# Patient Record
Sex: FEMALE | Race: WHITE | Employment: FULL TIME | URBAN - METROPOLITAN AREA
[De-identification: names, ages, dates, MRNs, and addresses within clinical notes are randomized per-mention and may not be internally consistent; named-entity substitution may affect disease eponyms.]

---

## 2017-12-11 ENCOUNTER — HOSPITAL ENCOUNTER (OUTPATIENT)
Dept: LAB | Age: 42
Discharge: HOME | End: 2017-12-11
Payer: COMMERCIAL

## 2017-12-11 ENCOUNTER — OFFICE VISIT (OUTPATIENT)
Dept: HEMATOLOGY | Age: 42
End: 2017-12-11

## 2017-12-11 VITALS
HEART RATE: 100 BPM | HEIGHT: 62 IN | TEMPERATURE: 98.7 F | RESPIRATION RATE: 16 BRPM | SYSTOLIC BLOOD PRESSURE: 127 MMHG | OXYGEN SATURATION: 100 % | BODY MASS INDEX: 28.36 KG/M2 | WEIGHT: 154.13 LBS | DIASTOLIC BLOOD PRESSURE: 78 MMHG

## 2017-12-11 DIAGNOSIS — K76.0 FATTY LIVER: Primary | ICD-10-CM

## 2017-12-11 DIAGNOSIS — K76.0 FATTY LIVER: ICD-10-CM

## 2017-12-11 PROBLEM — I10 HYPERTENSION: Status: ACTIVE | Noted: 2017-12-11

## 2017-12-11 LAB
ALBUMIN SERPL-MCNC: 3.8 G/DL (ref 3.4–5)
ALBUMIN/GLOB SERPL: 1.2 {RATIO} (ref 0.8–1.7)
ALP SERPL-CCNC: 47 U/L (ref 45–117)
ALT SERPL-CCNC: 24 U/L (ref 13–56)
ANION GAP SERPL CALC-SCNC: 11 MMOL/L (ref 3–18)
AST SERPL-CCNC: 12 U/L (ref 15–37)
BASOPHILS # BLD: 0 K/UL (ref 0–0.06)
BASOPHILS NFR BLD: 0 % (ref 0–2)
BILIRUB DIRECT SERPL-MCNC: 0.1 MG/DL (ref 0–0.2)
BILIRUB SERPL-MCNC: 0.4 MG/DL (ref 0.2–1)
BUN SERPL-MCNC: 11 MG/DL (ref 7–18)
BUN/CREAT SERPL: 14 (ref 12–20)
CALCIUM SERPL-MCNC: 9.5 MG/DL (ref 8.5–10.1)
CHLORIDE SERPL-SCNC: 105 MMOL/L (ref 100–108)
CHOLEST SERPL-MCNC: 147 MG/DL
CO2 SERPL-SCNC: 26 MMOL/L (ref 21–32)
CREAT SERPL-MCNC: 0.8 MG/DL (ref 0.6–1.3)
DIFFERENTIAL METHOD BLD: NORMAL
EOSINOPHIL # BLD: 0.2 K/UL (ref 0–0.4)
EOSINOPHIL NFR BLD: 3 % (ref 0–5)
ERYTHROCYTE [DISTWIDTH] IN BLOOD BY AUTOMATED COUNT: 12.1 % (ref 11.6–14.5)
EST. AVERAGE GLUCOSE BLD GHB EST-MCNC: 108 MG/DL
GLOBULIN SER CALC-MCNC: 3.2 G/DL (ref 2–4)
GLUCOSE SERPL-MCNC: 92 MG/DL (ref 74–99)
HBA1C MFR BLD: 5.4 % (ref 4.5–5.6)
HCT VFR BLD AUTO: 44.5 % (ref 35–45)
HDLC SERPL-MCNC: 46 MG/DL (ref 40–60)
HDLC SERPL: 3.2 {RATIO} (ref 0–5)
HGB BLD-MCNC: 15.2 G/DL (ref 12–16)
INR PPP: 1 (ref 0.8–1.2)
LDLC SERPL CALC-MCNC: 72.8 MG/DL (ref 0–100)
LIPID PROFILE,FLP: NORMAL
LYMPHOCYTES # BLD: 2.6 K/UL (ref 0.9–3.6)
LYMPHOCYTES NFR BLD: 33 % (ref 21–52)
MCH RBC QN AUTO: 31.8 PG (ref 24–34)
MCHC RBC AUTO-ENTMCNC: 34.2 G/DL (ref 31–37)
MCV RBC AUTO: 93.1 FL (ref 74–97)
MONOCYTES # BLD: 0.4 K/UL (ref 0.05–1.2)
MONOCYTES NFR BLD: 5 % (ref 3–10)
NEUTS SEG # BLD: 4.8 K/UL (ref 1.8–8)
NEUTS SEG NFR BLD: 59 % (ref 40–73)
PLATELET # BLD AUTO: 297 K/UL (ref 135–420)
PMV BLD AUTO: 10.2 FL (ref 9.2–11.8)
POTASSIUM SERPL-SCNC: 3.8 MMOL/L (ref 3.5–5.5)
PROT SERPL-MCNC: 7 G/DL (ref 6.4–8.2)
PROTHROMBIN TIME: 12.8 SEC (ref 11.5–15.2)
RBC # BLD AUTO: 4.78 M/UL (ref 4.2–5.3)
SODIUM SERPL-SCNC: 142 MMOL/L (ref 136–145)
TRIGL SERPL-MCNC: 141 MG/DL (ref ?–150)
VLDLC SERPL CALC-MCNC: 28.2 MG/DL
WBC # BLD AUTO: 8 K/UL (ref 4.6–13.2)

## 2017-12-11 PROCEDURE — 86706 HEP B SURFACE ANTIBODY: CPT | Performed by: INTERNAL MEDICINE

## 2017-12-11 PROCEDURE — 83540 ASSAY OF IRON: CPT | Performed by: INTERNAL MEDICINE

## 2017-12-11 PROCEDURE — 80076 HEPATIC FUNCTION PANEL: CPT | Performed by: INTERNAL MEDICINE

## 2017-12-11 PROCEDURE — 85610 PROTHROMBIN TIME: CPT | Performed by: INTERNAL MEDICINE

## 2017-12-11 PROCEDURE — 82103 ALPHA-1-ANTITRYPSIN TOTAL: CPT | Performed by: INTERNAL MEDICINE

## 2017-12-11 PROCEDURE — 86803 HEPATITIS C AB TEST: CPT | Performed by: INTERNAL MEDICINE

## 2017-12-11 PROCEDURE — 83516 IMMUNOASSAY NONANTIBODY: CPT | Performed by: INTERNAL MEDICINE

## 2017-12-11 PROCEDURE — 82390 ASSAY OF CERULOPLASMIN: CPT | Performed by: INTERNAL MEDICINE

## 2017-12-11 PROCEDURE — 87340 HEPATITIS B SURFACE AG IA: CPT | Performed by: INTERNAL MEDICINE

## 2017-12-11 PROCEDURE — 36415 COLL VENOUS BLD VENIPUNCTURE: CPT | Performed by: INTERNAL MEDICINE

## 2017-12-11 PROCEDURE — 80048 BASIC METABOLIC PNL TOTAL CA: CPT | Performed by: INTERNAL MEDICINE

## 2017-12-11 PROCEDURE — 85025 COMPLETE CBC W/AUTO DIFF WBC: CPT | Performed by: INTERNAL MEDICINE

## 2017-12-11 PROCEDURE — 86038 ANTINUCLEAR ANTIBODIES: CPT | Performed by: INTERNAL MEDICINE

## 2017-12-11 PROCEDURE — 83036 HEMOGLOBIN GLYCOSYLATED A1C: CPT | Performed by: INTERNAL MEDICINE

## 2017-12-11 PROCEDURE — 86704 HEP B CORE ANTIBODY TOTAL: CPT | Performed by: INTERNAL MEDICINE

## 2017-12-11 PROCEDURE — 86708 HEPATITIS A ANTIBODY: CPT | Performed by: INTERNAL MEDICINE

## 2017-12-11 PROCEDURE — 80061 LIPID PANEL: CPT | Performed by: INTERNAL MEDICINE

## 2017-12-11 PROCEDURE — 82728 ASSAY OF FERRITIN: CPT | Performed by: INTERNAL MEDICINE

## 2017-12-11 RX ORDER — NALTREXONE HYDROCHLORIDE AND BUPROPION HYDROCHLORIDE 8; 90 MG/1; MG/1
2 TABLET, EXTENDED RELEASE ORAL 2 TIMES DAILY
Refills: 0 | COMMUNITY
Start: 2017-12-01 | End: 2021-08-18

## 2017-12-11 RX ORDER — AMLODIPINE BESYLATE 2.5 MG/1
1 TABLET ORAL DAILY
Refills: 0 | COMMUNITY
Start: 2017-11-20

## 2017-12-11 NOTE — PROGRESS NOTES
354 Leny Koehler MD, [facility], [place], [place]       YURY Guevara PA-C Jud Alstrom, [facility]   Domenica Age, NP    Adiel Anand NP        at [facility]     [address], [address] [address]  [address].     [phone]     FAX: [phone]    at [facility], [address] [place] [address], [address]     [phone]     FAX: [phone]       Patient Care Team:  Mt Godoy DO as PCP - General (Family Practice)      Problem List  Date Reviewed: 12/11/2017          Codes Class Noted    Hypertension ICD-10-CM: I10  ICD-9-CM: 401.9  12/11/2017        Fatty liver ICD-10-CM: K76.0  ICD-9-CM: 571.8  12/11/2017                The physicians listed above have asked me to see Marilyn Faust in consultation regarding suspected fatty liver disease and its management. No medical records were available for review when the patient was here for the appointment. The patient is a 36 y.o.  female who is suspected to have fatty liver disease based upon ultrasound. Serologic evaluation was either not performed or available to me. Ultrasound of the liver was performed in 8/2017. The results of the imaging are not available at this time. The patient believes this suggested fatty liver disease. An assessment of liver fibrosis with biopsy or elastography has not been performed. The most recent laboratory studies are not avialable. The patient has lost a total of 8 pounds in the past 4 months since undergoing weight reduction surgery. The patient has no symptoms which could be attributed to the liver disorder. The patient completes all daily activities without any functional limitations.       The patient has not experienced fatigue, pain in the right side over the liver,       ALLERGIES  Allergies   Allergen Reactions    Cephalexin Rash    Cortisporin [Neomycin-Polymyxin-Hc] Rash       MEDICATIONS  Current Outpatient Prescriptions   Medication Sig    amLODIPine (NORVASC) 2.5 mg tablet Take 1 Tab by mouth daily.  CONTRAVE 8-90 mg TbER ER tablet Take 2 Tabs by mouth two (2) times a day.  hydrochlorothiazide (HYDRODIURIL) 25 mg tablet Take 25 mg by mouth daily.  omeprazole (PRILOSEC) 40 mg capsule Take 40 mg by mouth daily.  levonorgestrel-ethinyl estradiol (SEASONALE CONTRACEPTIVE) 0.15-30 mg-mcg per tablet Take  by mouth.  doxycycline (MONODOX) 100 mg capsule Take 100 mg by mouth two (2) times a day. No current facility-administered medications for this visit. SYSTEM REVIEW NOT RELATED TO LIVER DISEASE OR REVIEWED ABOVE:  Constitution systems: Negative for fever, chills, weight gain, weight loss. Eyes: Negative for visual changes. ENT: Negative for sore throat, painful swallowing. Respiratory: Negative for cough, hemoptysis, SOB. Cardiology: Negative for chest pain, palpitations. GI:  Negative for constipation or diarrhea. : Negative for urinary frequency, dysuria, hematuria, nocturia. Skin: Negative for rash. Hematology: Negative for easy bruising, blood clots. Musculo-skelatal: Negative for back pain, muscle pain, weakness. Neurologic: Negative for headaches, dizziness, vertigo, memory problems not related to HE. Psychology: Negative for anxiety, depression. FAMILY HISTORY:  The father has the following chronic diseases: Osler-Weber-Rendu, thrombosis. The mother has the following chronic diseases: DM, HTN. There is no family history of liver disease. SOCIAL HISTORY:  The patient is . The patient has 2 children,   The patient has never used tobacco products. The patient has never consumed significant amounts of alcohol. The patient currently works full time probabation and .         PHYSICAL EXAMINATION:  Visit Vitals    /78    Pulse 100    Temp 98.7 °F (37.1 °C) (Tympanic)    Resp 16    Ht 5' 2\" (1.575 m)    Wt 154 lb 2 oz (69.9 kg)    SpO2 100%    BMI 28.19 kg/m2     General: No acute distress. Eyes: Sclera anicteric. ENT: No oral lesions. Thyroid normal.  Nodes: No adenopathy. Skin: No spider angiomata. No jaundice. No palmar erythema. Respiratory: Lungs clear to auscultation. Cardiovascular: Regular heart rate. No murmurs. No JVD. Abdomen: Soft non-tender. Liver size normal to percussion/palpation. Spleen not palpable. No obvious ascites. Extremities: No edema. No muscle wasting. No gross arthritic changes. Neurologic: Alert and oriented. Cranial nerves grossly intact. No asterixis. LABORATORY STUDIES:  [facility] [place] of [address] Units 12/11/2017   WBC 4.6 - 13.2 K/uL 8.0   ANC 1.8 - 8.0 K/UL 4.8   HGB 12.0 - 16.0 g/dL 15.2    - 420 K/uL 297   INR 0.8 - 1.2   1.0   AST 15 - 37 U/L 12 (L)   ALT 13 - 56 U/L 24   Alk Phos 45 - 117 U/L 47   Bili, Total 0.2 - 1.0 MG/DL 0.4   Bili, Direct 0.0 - 0.2 MG/DL 0.1   Albumin 3.4 - 5.0 g/dL 3.8   BUN 7.0 - 18 MG/DL 11   Creat 0.6 - 1.3 MG/DL 0.80   Na 136 - 145 mmol/L 142   K 3.5 - 5.5 mmol/L 3.8   Cl 100 - 108 mmol/L 105   CO2 21 - 32 mmol/L 26   Glucose 74 - 99 mg/dL 92     SEROLOGIES:  Serologies Latest Ref Rng & Units 12/11/2017   Hep A Ab, Total NEGATIVE   NEGATIVE   Hep B Core Ab, Total NEGATIVE   NEGATIVE   Hep C Ab 0.0 - 0.9 s/co ratio <0.1   Ferritin 8 - 388 NG/   Iron % Saturation % 60   Ceruloplasmin 19.0 - 39.0 mg/dL 40.2 (H)   Alpha-1 antitrypsin level 90 - 200 mg/dL 151     LIVER HISTOLOGY:  Not available or performed    ENDOSCOPIC PROCEDURES:  Not available or performed    RADIOLOGY:  Not available or performed    OTHER TESTING:  Not available or performed    ASSESSMENT AND PLAN:  Suspected fatty liver disease of unclear histologic severity.       Liver transaminases are normal.  Alkaline phosphate is normal.  Liver function is normal.  The platelet count is normal.      Will perform laboratory testing to monitor liver function and degree of liver injury. This included BMP, hepatic panel, CBC with platelet count, INR. Will perform serologic and virologic studies to assess for other causes of chronic liver disease. Suspect the patient has fatty liver based upon ultrasound, and serology that is negative for all other causes of chronic liver disease. Only a liver biopsy can confirm is the patient does have fatty liver and differentiate benign steatosis from LANCE. The treatment is the same; weight reduction. If the liver biopsy demonstrates LANCE the patient would be eligible for enrollment into clinical trials for treatment of LANCE. The need to perform liver biopsy to assess disease severity was discussed with the patient. The risks of performing the liver biopsy including pain, puncture of the lung, gallbladder, intestine or kidney and bleeding were discussed. Will defer liver biopsy for now. The need to assess liver fibrosis was discussed. Sheer wave elastography can assess liver fibrosis and determine if a patient has advanced fibrosis or cirrhosis without the need for liver biopsy. Sheer wave elastography is now available at [facility]. This will be scheduled. If elastrography suggests advanced fibrosis then a liver biopsy should be considered. The patient was counseled regarding diet and exercise to achieve weight loss. The best diet for patients with fatty liver is one very low in carbohydrates and enriched with protein such as an Atkins program.      The patient was directed to continue all current medications at the current dosages. There are no contraindications for the patient to take any medications that are necessary for treatment of other medical issues including medications for diabetes mellitus and hypercholesterolemia.     The patient was counseled regarding alcohol consumption and that this could contribute to fatty liver disease. Vaccination for viral hepatitis A and B is recommended since the patient has no serologic evidence of previous exposure or vaccination with immunity. All of the above issues were discussed with the patient. All questions were answered. The patient expressed a clear understanding of the above. [address] [place] [address] in 4 weeks to review all data and determine the treatment plan.     Dimitry Roca MD  [facility] [place] of [address] [place] [address], [address] [place] [address], [address] [place] [address] Street [address]  [phone]

## 2017-12-11 NOTE — MR AVS SNAPSHOT
Visit Information Date & Time Provider Department Dept. Phone Encounter #  
 12/11/2017 11:30 AM MD Sierra Troncoso [address] of  [address] [id number] Follow-up Instructions Return in about 4 weeks (around 1/8/2018) for MLS on :35. Upcoming Health Maintenance Date Due DTaP/Tdap/Td series (1 - Tdap) 3/1/1995 PAP AKA CERVICAL CYTOLOGY 3/1/1995 Influenza Age 5 to Adult 8/1/2017 Allergies as of 12/11/2017  Review Complete On: 12/11/2017 By: Tena Pritchard Severity Noted Reaction Type Reactions Cephalexin  05/05/2015    Rash Cortisporin [Neomycin-polymyxin-hc]  05/05/2015    Rash Current Immunizations  Never Reviewed No immunizations on file. Not reviewed this visit You Were Diagnosed With   
  
 Codes Comments Fatty liver    -  Primary ICD-10-CM: K76.0 ICD-9-CM: 571.8 Vitals BP Pulse Temp Resp Height(growth percentile) Weight(growth percentile) 127/78 100 98.7 °F (37.1 °C) (Tympanic) 16 5' 2\" (1.575 m) 154 lb 2 oz (69.9 kg) SpO2 BMI OB Status Smoking Status 100% 28.19 kg/m2 Having regular periods Never Smoker BMI and BSA Data Body Mass Index Body Surface Area  
 28.19 kg/m 2 1.75 m 2 Your Updated Medication List  
  
   
This list is accurate as of: 12/11/17  1:06 PM.  Always use your most recent med list. amLODIPine 2.5 mg tablet Commonly known as:  Wandra Vickie Take 1 Tab by mouth daily. CONTRAVE 8-90 mg Tber ER tablet Generic drug:  naltrexone-buPROPion Take 2 Tabs by mouth two (2) times a day. doxycycline 100 mg capsule Commonly known as:  Daved Pollen Take 100 mg by mouth two (2) times a day. hydroCHLOROthiazide 25 mg tablet Commonly known as:  HYDRODIURIL Take 25 mg by mouth daily. PriLOSEC 40 mg capsule Generic drug:  omeprazole Take 40 mg by mouth daily. SEASONALE CONTRACEPTIVE 0.15 mg-30 mcg 3mpk Generic drug:  levonorgestrel-ethinyl estradiol Take  by mouth. Follow-up Instructions Return in about 4 weeks (around 1/8/2018) for MLS on :35. To-Do List   
 12/11/2017 Lab:  ACTIN (SMOOTH MUSCLE) ANTIBODY   
  
 12/11/2017 Lab:  ALPHA-1-ANTITRYPSIN, TOTAL   
  
 12/11/2017 Lab:  ANTINUCLEAR ANTIBODIES, IFA   
  
 12/11/2017 Lab:  CBC WITH AUTOMATED DIFF   
  
 12/11/2017 Lab:  CERULOPLASMIN   
  
 12/11/2017 Lab:  FERRITIN   
  
 12/11/2017 Lab:  HCV AB W/REFLEX VERIFICATION   
  
 12/11/2017 Lab:  HEMOGLOBIN A1C WITH EAG   
  
 12/11/2017 Lab:  HEP A AB, TOTAL   
  
 12/11/2017 Lab:  HEP B SURFACE AB   
  
 12/11/2017 Lab:  HEP B SURFACE AG   
  
 12/11/2017 Lab:  HEPATIC FUNCTION PANEL   
  
 12/11/2017 Lab:  HEPATITIS B CORE AB, TOTAL   
  
 12/11/2017 Lab:  IRON PROFILE   
  
 12/11/2017 Lab:  LIPID PANEL   
  
 12/11/2017 Lab:  METABOLIC PANEL, BASIC   
  
 12/11/2017 Lab:  PROTHROMBIN TIME + INR   
  
 12/11/2017 Imaging:  US ABD LTD W ELASTOGRAPHY Introducing [facility] & HE[facility]! Pierce Ibanez introduces Glow patient portal. Now you can access parts of your medical record, email your doctor's office, and request medication refills online. 1. In your internet browser, go to [email or web address] Dr[employer] [email or web address] 2. Click on the First Time User? Click Here link in the Sign In box. You will see the New Member Sign Up page. 3. Enter your Glow Access Code exactly as it appears below. You will not need to use this code after youve completed the sign-up process. If you do not sign up before the expiration date, you must request a new code. · Glow Access Code: [email or web address] Expires: 3/11/2018  1:06 PM 
 
4. Enter the last four digits of your Social Security Number (xxxx) and Date of Birth (mm/dd/yyyy) as indicated and click Submit.  You will be taken to the next sign-up page. 5. Create a Ad Knights ID. This will be your Ad Knights login ID and cannot be changed, so think of one that is secure and easy to remember. 6. Create a Ad Knights password. You can change your password at any time. 7. Enter your Password Reset Question and Answer. This can be used at a later time if you forget your password. 8. Enter your e-mail address. You will receive e-mail notification when new information is available in [address] 9. Click Sign Up. You can now view and download portions of your medical record. 10. Click the Download Summary menu link to download a portable copy of your medical information. If you have questions, please visit the Frequently Asked Questions section of the Ad Knights website. Remember, Ad Knights is NOT to be used for urgent needs. For medical emergencies, dial 911. Now available from your iPhone and Android! Please provide this summary of care documentation to your next provider. Your primary care clinician is listed as Elsi Yu. If you have any questions after today's visit, please call [phone].

## 2017-12-12 LAB
A1AT SERPL-MCNC: 151 MG/DL (ref 90–200)
CERULOPLASMIN SERPL-MCNC: 40.2 MG/DL (ref 19–39)
COMMENT, 144067: NORMAL
FERRITIN SERPL-MCNC: 210 NG/ML (ref 8–388)
HAV AB SER QL IA: NEGATIVE
HBV CORE AB SERPL QL IA: NEGATIVE
HCV AB S/CO SERPL IA: <0.1 S/CO RATIO (ref 0–0.9)
IRON SATN MFR SERPL: 60 %
IRON SERPL-MCNC: 213 UG/DL (ref 50–175)
TIBC SERPL-MCNC: 356 UG/DL (ref 250–450)

## 2017-12-13 LAB
ACTIN IGG SERPL-ACNC: 8 UNITS (ref 0–19)
ANA TITR SER IF: NEGATIVE {TITER}
HBV SURFACE AB SER QL IA: POSITIVE
HBV SURFACE AB SERPL IA-ACNC: 230.4 MIU/ML
HBV SURFACE AG SER QL: <0.1 INDEX
HBV SURFACE AG SER QL: NEGATIVE
HEP BS AB COMMENT,HBSAC: NORMAL

## 2017-12-27 ENCOUNTER — HOSPITAL ENCOUNTER (OUTPATIENT)
Dept: ULTRASOUND IMAGING | Age: 42
Discharge: HOME | End: 2017-12-27
Attending: INTERNAL MEDICINE
Payer: COMMERCIAL

## 2017-12-27 DIAGNOSIS — K76.0 FATTY LIVER: ICD-10-CM

## 2017-12-27 PROCEDURE — 0346T US ABD LTD W ELASTOGRAPHY: CPT

## 2018-02-26 ENCOUNTER — OFFICE VISIT (OUTPATIENT)
Dept: HEMATOLOGY | Age: 43
End: 2018-02-26

## 2018-02-26 VITALS
HEIGHT: 62 IN | DIASTOLIC BLOOD PRESSURE: 84 MMHG | HEART RATE: 104 BPM | SYSTOLIC BLOOD PRESSURE: 122 MMHG | OXYGEN SATURATION: 99 % | TEMPERATURE: 98.7 F | WEIGHT: 153 LBS | RESPIRATION RATE: 16 BRPM | BODY MASS INDEX: 28.16 KG/M2

## 2018-02-26 DIAGNOSIS — K76.0 FATTY LIVER: Primary | ICD-10-CM

## 2018-02-26 NOTE — MR AVS SNAPSHOT
[address] [address] 
[phone] Patient: Jarred Moura MRN: [id number] TOA:[id number] Visit Information Date & Time Provider Department Dept. Phone Encounter #  
 2/26/2018  1:45 PM Papito Dexter MD [address] [place] [address] [phone] Follow-up Instructions Return in about 6 months (around 8/26/2018) for [place]  or Tivis Son. Upcoming Health Maintenance Date Due DTaP/Tdap/Td series (1 - Tdap) 3/1/1995 PAP AKA CERVICAL CYTOLOGY 3/1/1995 Influenza Age 5 to Adult 8/1/2017 Allergies as of 2/26/2018  Review Complete On: 2/26/2018 By: Ileana Nelson Severity Noted Reaction Type Reactions Cephalexin  05/05/2015    Rash Cortisporin [Neomycin-polymyxin-hc]  05/05/2015    Rash Current Immunizations  Never Reviewed No immunizations on file. Not reviewed this visit Vitals BP Pulse Temp Resp Height(growth percentile) Weight(growth percentile) 122/84 (BP 1 Location: Right arm, BP Patient Position: Sitting) (!) 104 98.7 °F (37.1 °C) (Tympanic) 16 5' 2\" (1.575 m) 153 lb (69.4 kg) LMP SpO2 BMI OB Status Smoking Status 01/26/2018 99% 27.98 kg/m2 Having regular periods Never Smoker Vitals History BMI and BSA Data Body Mass Index Body Surface Area  
 27.98 kg/m 2 1.74 m 2 Your Updated Medication List  
  
   
This list is accurate as of 2/26/18  2:45 PM.  Always use your most recent med list. amLODIPine 2.5 mg tablet Commonly known as:  Tamika Link Take 1 Tab by mouth daily. CONTRAVE 8-90 mg Tber ER tablet Generic drug:  naltrexone-buPROPion Take 2 Tabs by mouth two (2) times a day. doxycycline 100 mg capsule Commonly known as:  Cara Jumper Take 100 mg by mouth two (2) times a day. hydroCHLOROthiazide 25 mg tablet Commonly known as:  HYDRODIURIL Take 25 mg by mouth daily. PriLOSEC 40 mg capsule Generic drug:  omeprazole Take 40 mg by mouth daily. SEASONALE CONTRACEPTIVE 0.15 mg-30 mcg 3mpk Generic drug:  levonorgestrel-ethinyl estradiol Take  by mouth. Follow-up Instructions Return in about 6 months (around 8/26/2018) for Rebecca Vasquez or [place]. Introducing [facility]! Dear Yomi Palomino: Thank you for requesting a Nearbox account. Our records indicate that you already have an active Nearbox account. You can access your account anytime at [email or web address]. [email or web address] Did you know that you can access your hospital and ER discharge instructions at any time in Nearbox? You can also review all of your test results from your hospital stay or ER visit. Additional Information If you have questions, please visit the Frequently Asked Questions section of the Nearbox website at [email or web address] Remember, Nearbox is NOT to be used for urgent needs. For medical emergencies, dial 911. Now available from your iPhone and Android! Please provide this summary of care documentation to your next provider. Your primary care clinician is listed as Elsi Yu. If you have any questions after today's visit, please call [phone].

## 2018-02-26 NOTE — PROGRESS NOTES
1. Have you been to the ER, urgent care clinic since your last visit? Hospitalized since your last visit? No    2. Have you seen or consulted any other health care providers outside of the [address] [place] [address] since your last visit? Include any pap smears or colon screening.  No

## 2018-02-26 NOTE — PROGRESS NOTES
[address] MD Srinivas, Sherrie Lyman, [facility] Alvaro Usman, [place]       YURY Lynn, AYSHA Ceja, [facility]   Imelda Connors, YURY Mccloud NP        at [address]     [address] [place] [address], [address] [place] [address], [address]     [phone]     FAX: [phone]    at [address], [address]     [phone]     FAX: [phone]       Patient Care Team:  Janki Clemons DO as PCP - General (Family Practice)      Problem List  Date Reviewed: 12/12/2017          Codes Class Noted    Hypertension ICD-10-CM: I10  ICD-9-CM: 401.9  12/11/2017        Fatty liver ICD-10-CM: K76.0  ICD-9-CM: 571.8  12/11/2017                Paula Cross returns to the [place] [address] for management of suspected non-alcoholic fatty liver (NAFL). The active problem list, all pertinent past medical history, medications, radiologic findings and laboratory findings related to the liver disorder were reviewed with the patient. The patient is a 36 y.o.  female who is suspected to have fatty liver disease based upon ultrasound. Serologic evaluation for causes of chronic liver disease was negative. The most recent imaging of the liver was Ultrasound performed in 12/2017. Results suggest fatty liver disease. Assessment of liver fibrosis was performed with sheer wave elastogrphy at THE [facility] in 12/2017. The result was 3.4 kPa which correlates with no fibrosis. The most recent laboratory studies indicate that the liver transaminases are normal, alkaline phosphatase is normal, tests of hepatic synthetic and metabolic function are normal, and the platelet count is normal.      The patient has lost a total of 10 pounds in the past 6 months. The patient has no symptoms which could be attributed to the liver disorder.       The patient completes all daily activities without any functional limitations. The patient has not experienced fatigue, pain in the right side over the liver,       ALLERGIES  Allergies   Allergen Reactions    Cephalexin Rash    Cortisporin [Neomycin-Polymyxin-Hc] Rash       MEDICATIONS  Current Outpatient Prescriptions   Medication Sig    amLODIPine (NORVASC) 2.5 mg tablet Take 1 Tab by mouth daily.  hydrochlorothiazide (HYDRODIURIL) 25 mg tablet Take 25 mg by mouth daily.  levonorgestrel-ethinyl estradiol (SEASONALE CONTRACEPTIVE) 0.15-30 mg-mcg per tablet Take  by mouth.  CONTRAVE 8-90 mg TbER ER tablet Take 2 Tabs by mouth two (2) times a day.  omeprazole (PRILOSEC) 40 mg capsule Take 40 mg by mouth daily.  doxycycline (MONODOX) 100 mg capsule Take 100 mg by mouth two (2) times a day. No current facility-administered medications for this visit. SYSTEM REVIEW NOT RELATED TO LIVER DISEASE OR REVIEWED ABOVE:  Constitution systems: Negative for fever, chills, weight gain, weight loss. Eyes: Negative for visual changes. ENT: Negative for sore throat, painful swallowing. Respiratory: Negative for cough, hemoptysis, SOB. Cardiology: Negative for chest pain, palpitations. GI:  Negative for constipation or diarrhea. : Negative for urinary frequency, dysuria, hematuria, nocturia. Skin: Negative for rash. Hematology: Negative for easy bruising, blood clots. Musculo-skelatal: Negative for back pain, muscle pain, weakness. Neurologic: Negative for headaches, dizziness, vertigo, memory problems not related to HE. Psychology: Negative for anxiety, depression. FAMILY HISTORY:  The father has the following chronic diseases: Osler-Weber-Rendu, thrombosis. The mother has the following chronic diseases: DM, HTN. There is no family history of liver disease. SOCIAL HISTORY:  The patient is . The patient has 2 children,   The patient has never used tobacco products.     The patient has never consumed significant amounts of alcohol. The patient currently works full time probabation and . PHYSICAL EXAMINATION:  Visit Vitals    /84 (BP 1 Location: Right arm, BP Patient Position: Sitting)    Pulse (!) 104    Temp 98.7 °F (37.1 °C) (Tympanic)    Resp 16    Ht 5' 2\" (1.575 m)    Wt 153 lb (69.4 kg)    LMP 01/26/2018    SpO2 99%    BMI 27.98 kg/m2     General: No acute distress. Eyes: Sclera anicteric. ENT: No oral lesions. Thyroid normal.  Nodes: No adenopathy. Skin: No spider angiomata. No jaundice. No palmar erythema. Respiratory: Lungs clear to auscultation. Cardiovascular: Regular heart rate. No murmurs. No JVD. Abdomen: Soft non-tender. Liver size normal to percussion/palpation. Spleen not palpable. No obvious ascites. Extremities: No edema. No muscle wasting. No gross arthritic changes. Neurologic: Alert and oriented. Cranial nerves grossly intact. No asterixis.     LABORATORY STUDIES:  Liver [place] [address] Units 12/11/2017   WBC 4.6 - 13.2 K/uL 8.0   ANC 1.8 - 8.0 K/UL 4.8   HGB 12.0 - 16.0 g/dL 15.2    - 420 K/uL 297   INR 0.8 - 1.2   1.0   AST 15 - 37 U/L 12 (L)   ALT 13 - 56 U/L 24   Alk Phos 45 - 117 U/L 47   Bili, Total 0.2 - 1.0 MG/DL 0.4   Bili, Direct 0.0 - 0.2 MG/DL 0.1   Albumin 3.4 - 5.0 g/dL 3.8   BUN 7.0 - 18 MG/DL 11   Creat 0.6 - 1.3 MG/DL 0.80   Na 136 - 145 mmol/L 142   K 3.5 - 5.5 mmol/L 3.8   Cl 100 - 108 mmol/L 105   CO2 21 - 32 mmol/L 26   Glucose 74 - 99 mg/dL 92     SEROLOGIES:  Serologies Latest Ref Rng & Units 12/11/2017   Hep A Ab, Total NEGATIVE   NEGATIVE   Hep B Surface Ag <1.00 Index <0.10   Hep B Surface Ag Interp NEG   NEGATIVE   Hep B Core Ab, Total NEGATIVE   NEGATIVE   Hep B Surface Ab >10.0 mIU/mL 230.40   Hep B Surface Ab Interp POS   POSITIVE   Hep C Ab 0.0 - 0.9 s/co ratio <0.1   Ferritin 8 - 388 NG/   Iron % Saturation % 60   MARIIA, IFA  NEGATIVE   ASMCA 0 - 19 Units 8 Ceruloplasmin 19.0 - 39.0 mg/dL 40.2 (H)   Alpha-1 antitrypsin level 90 - 200 mg/dL 151     LIVER HISTOLOGY:  12/2017. Sheer wave elastography performed at [facility]. E Range: 2.88-4.27 kPa, E Mean: 3.48 kPa, E Median: 3.49 kPa, E Std: 0.46 kPa. The results suggested a fibrosis level of F0. ENDOSCOPIC PROCEDURES:  Not available or performed    RADIOLOGY:  12/2018. Ultrasound of liver. Echogenic consistent with fatty liver. No liver mass lesions. No dilated bile ducts. No ascites. OTHER TESTING:  Not available or performed    ASSESSMENT AND PLAN:  Suspected fatty liver disease of unclear histologic severity. Elastography suggests this is mild NAFL with no fibrosis. Liver transaminases are normal.  Alkaline phosphate is normal.  Liver function is normal.  The platelet count is normal.      Suspect the patient has fatty liver based upon ultrasound, and serology that is negative for all other causes of chronic liver disease. Only a liver biopsy can confirm is the patient does have fatty liver and differentiate benign steatosis from LANCE. The treatment is the same; weight reduction. If the liver biopsy demonstrates LANCE the patient would be eligible for enrollment into clinical trials for treatment of LANCE. There is no reason to perform liver biopsy at this time. Liver chemistries and liver stiffness via elastography will be monitored. Elastography can be repeated annually to assess for fibrosis progression     Ultrasound can be repeated annually to see if fatty liver disease resolves with weight loss. The patient was counseled regarding diet and exercise to achieve weight loss. The best diet for patients with fatty liver is one very low in carbohydrates and enriched with protein such as an Atkins program.      The patient was directed to continue all current medications at the current dosages.   There are no contraindications for the patient to take any medications that are necessary for treatment of other medical issues including medications for diabetes mellitus and hypercholesterolemia. The patient was counseled regarding alcohol consumption and that this could contribute to fatty liver disease. Vaccination for viral hepatitis A and B is recommended since the patient has no serologic evidence of previous exposure or vaccination with immunity. All of the above issues were discussed with the patient. All questions were answered. The patient expressed a clear understanding of the above. [facility] 87 in 6 months.     Callie Quiroz MD  [facility] [place] [facility] [address], [address] [place] [address], [facility] [address]  [phone]

## 2021-08-13 PROBLEM — R73.09 OTHER ABNORMAL GLUCOSE: Status: ACTIVE | Noted: 2021-08-13

## 2021-08-13 PROBLEM — M54.2 CERVICALGIA: Status: ACTIVE | Noted: 2021-08-13

## 2021-08-13 PROBLEM — B07.9 VIRAL WART: Status: ACTIVE | Noted: 2021-08-13

## 2021-08-13 PROBLEM — M72.2 PLANTAR FASCIAL FIBROMATOSIS: Status: ACTIVE | Noted: 2021-08-13

## 2021-08-13 PROBLEM — R09.82 POSTNASAL DRIP: Status: ACTIVE | Noted: 2021-08-13

## 2021-08-13 PROBLEM — M54.50 LOW BACK PAIN: Status: ACTIVE | Noted: 2021-08-13

## 2021-08-13 PROBLEM — H52.203 MYOPIA OF BOTH EYES WITH ASTIGMATISM AND PRESBYOPIA: Status: ACTIVE | Noted: 2017-12-11

## 2021-08-13 PROBLEM — M75.41 IMPINGEMENT SYNDROME OF RIGHT SHOULDER: Status: ACTIVE | Noted: 2021-08-13

## 2021-08-13 PROBLEM — H35.413 BILATERAL RETINAL LATTICE DEGENERATION: Status: ACTIVE | Noted: 2017-12-11

## 2021-08-13 PROBLEM — L03.039 ONYCHIA AND PARONYCHIA OF TOE: Status: ACTIVE | Noted: 2021-08-13

## 2021-08-13 PROBLEM — E66.3 OVERWEIGHT: Status: ACTIVE | Noted: 2017-11-30

## 2021-08-13 PROBLEM — G93.32 CHRONIC FATIGUE SYNDROME: Status: ACTIVE | Noted: 2021-08-13

## 2021-08-13 PROBLEM — H52.13 MYOPIA OF BOTH EYES WITH ASTIGMATISM AND PRESBYOPIA: Status: ACTIVE | Noted: 2017-12-11

## 2021-08-13 PROBLEM — H40.003 GLAUCOMA SUSPECT OF BOTH EYES: Status: ACTIVE | Noted: 2017-12-11

## 2021-08-13 PROBLEM — I10 BENIGN ESSENTIAL HYPERTENSION: Status: ACTIVE | Noted: 2017-06-19

## 2021-08-13 PROBLEM — H52.4 MYOPIA OF BOTH EYES WITH ASTIGMATISM AND PRESBYOPIA: Status: ACTIVE | Noted: 2017-12-11

## 2022-03-18 PROBLEM — B07.9 VIRAL WART: Status: ACTIVE | Noted: 2021-08-13

## 2022-03-18 PROBLEM — M75.41 IMPINGEMENT SYNDROME OF RIGHT SHOULDER: Status: ACTIVE | Noted: 2021-08-13

## 2022-03-18 PROBLEM — M54.50 LOW BACK PAIN: Status: ACTIVE | Noted: 2021-08-13

## 2022-03-18 PROBLEM — K76.0 NONALCOHOLIC FATTY LIVER DISEASE: Status: ACTIVE | Noted: 2017-12-11

## 2022-03-19 PROBLEM — H35.413 BILATERAL RETINAL LATTICE DEGENERATION: Status: ACTIVE | Noted: 2017-12-11

## 2022-03-19 PROBLEM — M72.2 PLANTAR FASCIAL FIBROMATOSIS: Status: ACTIVE | Noted: 2021-08-13

## 2022-03-19 PROBLEM — M54.2 CERVICALGIA: Status: ACTIVE | Noted: 2021-08-13

## 2022-03-19 PROBLEM — H52.13 MYOPIA OF BOTH EYES WITH ASTIGMATISM AND PRESBYOPIA: Status: ACTIVE | Noted: 2017-12-11

## 2022-03-19 PROBLEM — H52.203 MYOPIA OF BOTH EYES WITH ASTIGMATISM AND PRESBYOPIA: Status: ACTIVE | Noted: 2017-12-11

## 2022-03-19 PROBLEM — E66.3 OVERWEIGHT: Status: ACTIVE | Noted: 2017-11-30

## 2022-03-19 PROBLEM — R09.82 POSTNASAL DRIP: Status: ACTIVE | Noted: 2021-08-13

## 2022-03-19 PROBLEM — H52.4 MYOPIA OF BOTH EYES WITH ASTIGMATISM AND PRESBYOPIA: Status: ACTIVE | Noted: 2017-12-11

## 2022-03-19 PROBLEM — H40.003 GLAUCOMA SUSPECT OF BOTH EYES: Status: ACTIVE | Noted: 2017-12-11

## 2022-03-19 PROBLEM — G93.32 CHRONIC FATIGUE SYNDROME: Status: ACTIVE | Noted: 2021-08-13

## 2022-03-20 PROBLEM — R73.09 OTHER ABNORMAL GLUCOSE: Status: ACTIVE | Noted: 2021-08-13

## 2023-02-15 ENCOUNTER — HOSPITAL ENCOUNTER (OUTPATIENT)
Facility: HOSPITAL | Age: 48
Setting detail: OUTPATIENT SURGERY
Discharge: HOME | End: 2023-02-15
Attending: INTERNAL MEDICINE | Admitting: INTERNAL MEDICINE
Payer: COMMERCIAL

## 2023-02-15 VITALS
HEART RATE: 83 BPM | HEIGHT: 61 IN | SYSTOLIC BLOOD PRESSURE: 107 MMHG | DIASTOLIC BLOOD PRESSURE: 65 MMHG | OXYGEN SATURATION: 97 % | BODY MASS INDEX: 26.92 KG/M2 | RESPIRATION RATE: 14 BRPM | TEMPERATURE: 98.3 F | WEIGHT: 142.6 LBS

## 2023-02-15 LAB — HCG UR QL: NEGATIVE

## 2023-02-15 PROCEDURE — 3600007511: Performed by: INTERNAL MEDICINE

## 2023-02-15 PROCEDURE — 6360000002 HC RX W HCPCS: Performed by: INTERNAL MEDICINE

## 2023-02-15 PROCEDURE — 3600007501: Performed by: INTERNAL MEDICINE

## 2023-02-15 PROCEDURE — 99153 MOD SED SAME PHYS/QHP EA: CPT | Performed by: INTERNAL MEDICINE

## 2023-02-15 PROCEDURE — 7100000010 HC PHASE II RECOVERY - FIRST 15 MIN: Performed by: INTERNAL MEDICINE

## 2023-02-15 PROCEDURE — 2580000003 HC RX 258: Performed by: INTERNAL MEDICINE

## 2023-02-15 PROCEDURE — 2709999900 HC NON-CHARGEABLE SUPPLY: Performed by: INTERNAL MEDICINE

## 2023-02-15 PROCEDURE — 81025 URINE PREGNANCY TEST: CPT

## 2023-02-15 PROCEDURE — 99152 MOD SED SAME PHYS/QHP 5/>YRS: CPT | Performed by: INTERNAL MEDICINE

## 2023-02-15 PROCEDURE — 7100000011 HC PHASE II RECOVERY - ADDTL 15 MIN: Performed by: INTERNAL MEDICINE

## 2023-02-15 RX ORDER — MIDAZOLAM HYDROCHLORIDE 1 MG/ML
5 INJECTION, SOLUTION INTRAMUSCULAR; INTRAVENOUS
Status: DISCONTINUED | OUTPATIENT
Start: 2023-02-15 | End: 2023-02-15 | Stop reason: HOSPADM

## 2023-02-15 RX ORDER — ACETAMINOPHEN AND CODEINE PHOSPHATE 120; 12 MG/5ML; MG/5ML
0.35 SOLUTION ORAL DAILY
COMMUNITY
Start: 2021-08-18

## 2023-02-15 RX ORDER — GLYCOPYRROLATE 0.2 MG/ML
0.1 INJECTION INTRAMUSCULAR; INTRAVENOUS ONCE
Status: DISCONTINUED | OUTPATIENT
Start: 2023-02-15 | End: 2023-02-15 | Stop reason: HOSPADM

## 2023-02-15 RX ORDER — SODIUM CHLORIDE 9 MG/ML
25 INJECTION, SOLUTION INTRAVENOUS PRN
Status: CANCELLED | OUTPATIENT
Start: 2023-02-15

## 2023-02-15 RX ORDER — SIMETHICONE 20 MG/.3ML
40 EMULSION ORAL EVERY 6 HOURS PRN
Status: DISCONTINUED | OUTPATIENT
Start: 2023-02-15 | End: 2023-02-15 | Stop reason: HOSPADM

## 2023-02-15 RX ORDER — MIDAZOLAM HYDROCHLORIDE 1 MG/ML
INJECTION INTRAMUSCULAR; INTRAVENOUS PRN
Status: DISCONTINUED | OUTPATIENT
Start: 2023-02-15 | End: 2023-02-15 | Stop reason: ALTCHOICE

## 2023-02-15 RX ORDER — FLUMAZENIL 0.1 MG/ML
0.2 INJECTION INTRAVENOUS ONCE
Status: DISCONTINUED | OUTPATIENT
Start: 2023-02-15 | End: 2023-02-15 | Stop reason: HOSPADM

## 2023-02-15 RX ORDER — NALOXONE HYDROCHLORIDE 0.4 MG/ML
0.4 INJECTION, SOLUTION INTRAMUSCULAR; INTRAVENOUS; SUBCUTANEOUS PRN
Status: DISCONTINUED | OUTPATIENT
Start: 2023-02-15 | End: 2023-02-15 | Stop reason: HOSPADM

## 2023-02-15 RX ORDER — AMLODIPINE BESYLATE 2.5 MG/1
1 TABLET ORAL DAILY
COMMUNITY
Start: 2017-11-20

## 2023-02-15 RX ORDER — SPIRONOLACTONE 50 MG/1
25 TABLET, FILM COATED ORAL DAILY
COMMUNITY
Start: 2019-05-10

## 2023-02-15 RX ORDER — SODIUM CHLORIDE 9 MG/ML
INJECTION, SOLUTION INTRAVENOUS CONTINUOUS
Status: CANCELLED | OUTPATIENT
Start: 2023-02-15

## 2023-02-15 RX ORDER — SODIUM CHLORIDE 9 MG/ML
INJECTION, SOLUTION INTRAVENOUS CONTINUOUS
Status: DISCONTINUED | OUTPATIENT
Start: 2023-02-15 | End: 2023-02-15 | Stop reason: HOSPADM

## 2023-02-15 RX ORDER — SODIUM CHLORIDE 0.9 % (FLUSH) 0.9 %
5-40 SYRINGE (ML) INJECTION EVERY 12 HOURS SCHEDULED
Status: CANCELLED | OUTPATIENT
Start: 2023-02-15

## 2023-02-15 RX ORDER — EPINEPHRINE 0.1 MG/ML
1 SYRINGE (ML) INJECTION ONCE
Status: DISCONTINUED | OUTPATIENT
Start: 2023-02-15 | End: 2023-02-15 | Stop reason: HOSPADM

## 2023-02-15 RX ORDER — FENTANYL CITRATE 50 UG/ML
INJECTION, SOLUTION INTRAMUSCULAR; INTRAVENOUS PRN
Status: DISCONTINUED | OUTPATIENT
Start: 2023-02-15 | End: 2023-02-15 | Stop reason: ALTCHOICE

## 2023-02-15 RX ORDER — DIPHENHYDRAMINE HYDROCHLORIDE 50 MG/ML
25 INJECTION INTRAMUSCULAR; INTRAVENOUS EVERY 6 HOURS PRN
Status: DISCONTINUED | OUTPATIENT
Start: 2023-02-15 | End: 2023-02-15 | Stop reason: HOSPADM

## 2023-02-15 RX ORDER — HYDROCHLOROTHIAZIDE 25 MG/1
25 TABLET ORAL DAILY
COMMUNITY
Start: 2016-11-18

## 2023-02-15 RX ORDER — DEXTROAMPHETAMINE SACCHARATE, AMPHETAMINE ASPARTATE, DEXTROAMPHETAMINE SULFATE AND AMPHETAMINE SULFATE 2.5; 2.5; 2.5; 2.5 MG/1; MG/1; MG/1; MG/1
10 TABLET ORAL DAILY
COMMUNITY

## 2023-02-15 RX ORDER — IBUPROFEN 200 MG
400 TABLET ORAL EVERY 6 HOURS PRN
COMMUNITY

## 2023-02-15 RX ORDER — SODIUM CHLORIDE 0.9 % (FLUSH) 0.9 %
5-40 SYRINGE (ML) INJECTION PRN
Status: CANCELLED | OUTPATIENT
Start: 2023-02-15

## 2023-02-15 RX ADMIN — SODIUM CHLORIDE: 9 INJECTION, SOLUTION INTRAVENOUS at 11:30

## 2023-02-15 ASSESSMENT — PAIN SCALES - GENERAL
PAINLEVEL_OUTOF10: 0

## 2023-02-15 ASSESSMENT — PAIN - FUNCTIONAL ASSESSMENT: PAIN_FUNCTIONAL_ASSESSMENT: 0-10

## 2023-02-15 NOTE — DISCHARGE INSTRUCTIONS
Ping Renae  [id number]  1975    COLON DISCHARGE INSTRUCTIONS    Discomfort:  Redness at IV site- apply warm compress to area; if redness or soreness persist- contact your physician  There may be a slight amount of blood passed from the rectum  Gaseous discomfort- walking, belching will help relieve any discomfort  You may not operate a vehicle til the next day. You may not engage in an occupation involving machinery or appliances til the next day. You may not drink alcoholic beverages til the next day. DIET:   High fiber diet. ACTIVITY:  You may not  resume your normal daily activities til the next day. it is recommended that you spend the remainder of the day resting -  avoid any strenuous activity. CALL M.D.  IF ANY SIGN OF:   Increasing pain, nausea, vomiting  Abdominal distension (swelling)  New increased bleeding (oral or rectal)  Fever (chills)  Pain in chest area  Bloody discharge from nose or mouth  Shortness of breath    You may  take any Advil, Aspirin, Ibuprofen, Motrin, Aleve, or Goodys  but prerably Tylenol as needed for pain. Post procedure diagnosis:  small internal hemorrhoids    Follow-up Instructions: Your follow up colonoscopy will be in 10 years. Marija Kimball MD  February 15, 2023       DISCHARGE SUMMARY from Nurse    PATIENT INSTRUCTIONS:    After general anesthesia or intravenous sedation, for 24 hours or while taking prescription Narcotics:  Limit your activities  Do not drive and operate hazardous machinery  Do not make important personal or business decisions  Do  not drink alcoholic beverages  If you have not urinated within 8 hours after discharge, please contact your surgeon on call.     Report the following to your surgeon:  Excessive pain, swelling, redness or odor of or around the surgical area  Temperature over 100.5  Nausea and vomiting lasting longer than 4 hours or if unable to take medications  Any signs of decreased circulation or nerve impairment to extremity: change in color, persistent  numbness, tingling, coldness or increase pain  Any questions    What to do at Home:  Recommended activity: as above,     If you experience any of the following symptoms as above, please follow up with Dr. Marcella Field. *  Please give a list of your current medications to your Primary Care Provider. *  Please update this list whenever your medications are discontinued, doses are      changed, or new medications (including over-the-counter products) are added. *  Please carry medication information at all times in case of emergency situations. These are general instructions for a healthy lifestyle:    No smoking/ No tobacco products/ Avoid exposure to second hand smoke  Surgeon General's Warning:  Quitting smoking now greatly reduces serious risk to your health. Obesity, smoking, and sedentary lifestyle greatly increases your risk for illness    A healthy diet, regular physical exercise & weight monitoring are important for maintaining a healthy lifestyle    You may be retaining fluid if you have a history of heart failure or if you experience any of the following symptoms:  Weight gain of 3 pounds or more overnight or 5 pounds in a week, increased swelling in our hands or feet or shortness of breath while lying flat in bed. Please call your doctor as soon as you notice any of these symptoms; do not wait until your next office visit. The discharge information has been reviewed with the patient and spouse. The patient and spouse verbalized understanding. Discharge medications reviewed with the patient and spouse and appropriate educational materials and side effects teaching were provided.     Patient armband removed and shredded    ___________________________________________________________________________________________________________________________________

## 2023-02-15 NOTE — H&P
Assessment/Plan  # Detail Type Description    1. Assessment Chronic idiopathic constipation (K59.04). Impression Pt of Dr. Sj Corbett, here for her first screening colonoscopy. Pt c/o chronic constipation, and bloating as well.  ___________________________________________  *Life-Long Constipation struggles, alternating occasionally with bouts of transient dietary-triggered loose stools, then right back to baseline of constipation, abdominal fullness and cramping, bloating, tenesmus (increasingly worsened recently). Constipation is exacerbated by traveling longer distances. 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 She expects this has contributed some to worsened constipation as well. She exercises 3-4 times a week. Clean eating with strict diet did seem to help, but was un-sustainable long-term. *Treatments tried and failed:  Increased PO daily water intake, Increased dietary fiber intake, Miralax, Dulcolax, Stool Softeners, Milk of Magnesia, OTC laxatives (Mag Citrate, Senakot, Senna tea), Dulcolax and fleet suppositories and enemas.   -Rx constipation medications: None yet. BM: Irregular. Patient Plan -Will Prescribe Linzess, Pt to call back and notify office of which dosage works best for her Rx to be sent in (72mcg versus 145mcg). -Printed med admin instructions provided to patient (take one or two daily)  -Coupons and Samples provided to Patient today:  (72mcg Samples provided; Lot: [id number], Exp: 5/2023)    *Constipation Instructions provided to pt: (Printed copy provided to patient)  -Take Miralax (1 capful in AM) to increase water content in stools. Try to drink more water, walk as often as possible, and eat fresh fruits and vegetables. Try not to neglect eating cooked vegetables, and smoothies as well. Instructed patient to retrain body to attempt BM every morning, do not hold it.   Instead try to have a bowel movement when sensation is present.  -Hold fiber supplements while constipated, as this will only make constipation worse. Slowly add supplements back in only after bowels are consistently moving         2. Assessment Bloating (R14.0). Impression See Above. 3. Assessment Encounter for screening for cancer of colon (Z12.11). Impression Never had a colonoscopy before. Negative Cologuard dated 6/10/2020. Average risk, no FHx of colon cancer. BMI: 27.59 (Short Frame)  *PM/SH: , Abdominoplasty (), GERD, HTN, Seasonal Allergies, Eustachian tubes placed ( & ), Tonsillectomy. No reported hx of additional abdominal surgeries, strokes, or CAD. Patient Plan *C-scope Plan:  First Colonoscopy ordered with Dr. Janae Platt with Miralax bowel prep, and Mag Citrate 2 days before prep, and Miralax and stool softeners starting 3 days before prep.  -Patient is advised to take Thyroid meds, BP meds, beta blockers, and any cardiac meds the AM of procedure with sip clear liquid after prep. *C-scope Risks:  Stressed importance of following all bowel preparation instructions. Explained the procedure to the patient including all risks and benefits. These risks consist of missed lesions on exam, bleeding, and bowel perforation with possible need for admission to the hospital, and in the most extensive of  circumstances, the patient may require surgery. Pt verbalized understanding of these risks and is agreeable with this procedure. Plan Orders Further diagnostic evaluations ordered today include(s) DIAGNOSTIC COLONOSCOPY to be performed. 4. Assessment Gastroesophageal reflux disease (K21.9). Impression -Taking Amlodipine (CCB)  -Taking Omeprazole 20mg QAM (well-controlled on PPI).     Patient Plan -Would suggest to PCP D/C Amlodipine, and stay away from CCB's for antihypertensive therapy, as this drug class is known for contributing to reflux symptoms d/t vasodilation and smooth muscle relaxation              This 49year old  patient was referred by Ari Soto. This 49year old female presents for Colon Cancer Screening and Constipation. History of Present Illness  1. Colon Cancer Screening   No prior screening. Denies risk factors. Associated symptoms include constipation. Pertinent negatives include abdominal pain, change in bowel habits, change in stool caliber, decreased appetite, diarrhea, melena, nausea, rectal bleeding, vomiting, weight gain and weight loss. Additional information: No family history of colon cancer and BM irregular. 2.  Constipation   Severity level moderate. The patient describes it as difficulty passing, feeling of fullness and hard. It occurs weekly. The problem is with no change. Symptom is aggravated by dehydration, medication change and stress. Relieving factors include laxatives and MiraLax. The patient is also experiencing bloating. Pertinent negatives include abdominal pain, anorexia, back pain, black tarry stools, bleeding with bowel movement, change in appetite, change in stool caliber, change in stool pattern, flatulence, nausea, pain with passing stool, sedentary activity, vomiting, weight gain and weight loss. Problem List  Problem Description Onset Date Chronic Clinical Status Notes   Benign essential hypertension 07/10/2014 N     Epigastric pain 2015 N     Gastroesophageal reflux disease 2015 N     Screening for malignant neoplasm of colon done 2022 N     Gastroesophageal reflux in child 2022 N     Abdominal bloating 2022 N     Constipation - functional 2022 N       Past Medical/Surgical History   (Detailed)  Disease/disorder Onset Date Management Date Comments   Ear infection  tubes in ears.  2014      Tummy tuck     Diabetes, gestational       Seasonal allergies             Family History   (Detailed)    Relationship Family Member Name  Age at Death Condition Onset Age Cause of Death       Family history of Reflux  N   Father  N  Hypertension  N   Father    Asthma  N   Father  N  Diabetes mellitus  N   Mother  N  Diabetes mellitus  N   Mother  N  Hypertension  N     Social History  (Detailed)  Tobacco use reviewed. Preferred language is [place]. Marital Status/Family/Social Support  Marital status:      Tobacco use status: Never smoked tobacco.    Smoking status: Never smoker. Tobacco Screening  Patient has never used tobacco. Patient has not used tobacco in the last 30 days. Patient has not used smokeless tobacco in the last 30 days. Smoking Status  Type Smoking Status Usage Per Day Years Used Pack Years Total Pack Years    Never smoker         Tobacco/Vaping Exposure  No passive smoke exposure. Alcohol  There is a history of alcohol use. Type: Hard liquor. consumed occasionally. Caffeine  The patient uses caffeine: coffee and soda. .      Medications (active prior to today)  Medication Instructions Start Date Stop Date Refilled Elsewhere   spironolactone 25 mg tablet take 1 tablet by oral route  every day 12/01/2020   N   AMLODIPINE BESYLATE 2.5MG TABLETS TAKE 1 TABLET BY MOUTH EVERY DAY 06/22/2021 06/22/2021 N   HYDROCHLOROTHIAZIDE 25MG TABLETS TAKE 1 TABLET BY MOUTH EVERY DAY 09/15/2021  09/15/2021 N   Prilosec OTC 20 mg tablet,delayed release take 1 Tablet by Oral route  every morning 09/27/2021 09/27/2021 N   norethindrone (contraceptive) 0.35 mg tablet take 1 tablet by oral route  every day 09/27/2021   N   Medrol (Fritz) 4 mg tablets in a dose pack take by oral route as directed per package instructions 02/08/2022   N   methocarbamol 750 mg tablet take 1 tablet by oral route every 12 hrs prn muscle pain 02/08/2022 02/08/2022 N   Adderall 10 mg tablet take 1 tablet by oral route  every day at 1 pm 02/08/2022 02/08/2022 N   Adderall XR 10 mg capsule,extended release take 1 capsule by oral route  every day in the morning upon awakening 02/08/2022 02/08/2022 N   Bactrim  mg-160 mg tablet take 1 tablet by oral route  every 12 hours 02/11/2022 04/05/2022  N     Patient Status   Completed with information received for patient in a summary of care record. Medication Reconciliation  Medications reconciled today.     Medication Reviewed  Adherence Medication Name Sig Desc Elsewhere Status   taking as directed HYDROCHLOROTHIAZIDE 25MG TABLETS TAKE 1 TABLET BY MOUTH EVERY DAY N Verified   taking as directed Adderall XR 10 mg capsule,extended release take 1 capsule by oral route  every day in the morning upon awakening N Verified   taking as directed spironolactone 25 mg tablet take 1 tablet by oral route  every day N Verified   taking as directed methocarbamol 750 mg tablet take 1 tablet by oral route every 12 hrs prn muscle pain N Verified   taking as directed norethindrone (contraceptive) 0.35 mg tablet take 1 tablet by oral route  every day N Verified   taking as directed AMLODIPINE BESYLATE 2.5MG TABLETS TAKE 1 TABLET BY MOUTH EVERY DAY N Verified   taking as directed Prilosec OTC 20 mg tablet,delayed release take 1 Tablet by Oral route  every morning N Verified   taking as directed Adderall 10 mg tablet take 1 tablet by oral route  every day at 1 pm N Verified   taking as directed Medrol (Fritz) 4 mg tablets in a dose pack take by oral route as directed per package instructions N Verified     Medications (Added, Continued or Stopped today)  Start Date Medication Directions PRN Status PRN Reason Instruction Stop Date   02/08/2022 Adderall 10 mg tablet take 1 tablet by oral route  every day at 1 pm N      02/08/2022 Adderall XR 10 mg capsule,extended release take 1 capsule by oral route  every day in the morning upon awakening N      06/22/2021 AMLODIPINE BESYLATE 2.5MG TABLETS TAKE 1 TABLET BY MOUTH EVERY DAY N      02/11/2022 Bactrim  mg-160 mg tablet take 1 tablet by oral route  every 12 hours N   04/05/2022   09/15/2021 HYDROCHLOROTHIAZIDE 25MG TABLETS TAKE 1 TABLET BY MOUTH EVERY DAY N      02/08/2022 Medrol (Fritz) 4 mg tablets in a dose pack take by oral route as directed per package instructions N      02/08/2022 methocarbamol 750 mg tablet take 1 tablet by oral route every 12 hrs prn muscle pain N      09/27/2021 norethindrone (contraceptive) 0.35 mg tablet take 1 tablet by oral route  every day N      09/27/2021 Prilosec OTC 20 mg tablet,delayed release take 1 Tablet by Oral route  every morning N      12/01/2020 spironolactone 25 mg tablet take 1 tablet by oral route  every day N        Allergies  Ingredient Reaction (Severity) Medication Name Comment   BACITRACIN rash, Cortisporin    BACITRACIN ZINC rash, Cortisporin    CEPHALOSPORINS Rash     CIPROFLOXACIN Hives Cipro    CIPROFLOXACIN HCL Hives Cipro    HYDROCORTISONE rash, Cortisporin    NEOMYCIN rash, Cortisporin    NEOMYCIN SULFATE rash, Cortisporin    POLYMYXIN B rash, Cortisporin    POLYMYXIN B SULFATE rash, Cortisporin      Reviewed, no changes. Review of Systems  System Neg/Pos Details   Constitutional Negative Fever, Sedentary activity, Weight gain and Weight loss. ENMT Negative Sinus Infection. Eyes Negative Double vision. Respiratory Negative Asthma, Chronic cough and Dyspnea. Cardio Negative Chest pain, Edema and Irregular heartbeat/palpitations. GI Positive Bloating, Constipation. GI Negative Abdominal pain, Anorexia, Black tarry stools, Bleeding with bowel movement, Change in appetite, Change in bowel habits, Change in stool caliber, Change in stool pattern, Decreased appetite, Diarrhea, Dysphagia, Flatulence, Heartburn, Hematemesis, Hematochezia, Melena, Nausea, Painful defecation, Rectal bleeding, Reflux and Vomiting.  Negative Back pain, Dysuria and Hematuria. Endocrine Negative Cold intolerance and Heat intolerance. Neuro Negative Dizziness, Headache, Numbness and Tremors. Psych Negative Anxiety, Depression and Increased stress. Integumentary Negative Hives, Pruritus and Rash. MS Negative Back pain, Joint pain and Myalgia. Hema/Lymph Negative Easy bleeding, Easy bruising and Lymphadenopathy. Allergic/Immuno Negative Food allergies and Immunosuppression. Vital Signs   Height  Time ft in cm Last Measured Height Position   10:53 AM 5.0 1.00 154.94 04/05/2022 Standing     Weight/BSA/BMI  Time lb oz kg Context BMI kg/m2 BSA m2   10:53 .00  66.224 dressed with shoes 27.59 1.69     Date/Time Temp Pulse Resp BP SpO2 O2 Device O2 Flow Rate (L/min) Weight [facility]   02/15/23 1131 98.3 °F (36.8 °C) 94 14 120/76 100 % None (Room air) -- 142 lb 9.6 oz (64.7 kg) CS     Physical  Exam  Exam Findings Details   Female GI Quick Visit Comments Short Frame. Constitutional Normal Well developed. Eyes Normal Conjunctiva - Right: Normal, Left: Normal. Sclera - Right: Normal, Left: Normal.   Nasopharynx Normal Lips/teeth/gums - Normal.   Neck Exam Normal Inspection - Normal.   Respiratory Normal Inspection - Normal.   Cardiovascular Normal Regular rate and rhythm. No murmurs, gallops, or rubs. Vascular Normal Pulses - Brachial: Normal.   Skin Normal Inspection - Normal.   Musculoskeletal Normal Hands/Wrist - Right: Normal, Left: Normal.   Extremity Normal No edema. Neurological Normal Fine motor skills - Normal.   Psychiatric Normal Orientation - Oriented to time, place, person & situation. Appropriate mood and affect. Patient Education  # Patient Education   1.  Constipation: Care Instructions       Immunizations Entered by History  Date Immunization   10/12/2012 10:10:00 AM Flu (split) (3 yrs or older)       Active Patient Care Team Members  Name Contact Agency Type Support Role Relationship Active Date Inactive Date Specialty   [facility]   Patient provider PCP   [address]   encounter provider    Gastroenterology   Patient questioned and examined

## 2023-02-15 NOTE — PRE SEDATION
Sedation Pre-Procedure Note    Patient Name: Honey Loredo   YOB: 1975  Room/Bed: ENDO/PL  Medical Record Number: [id number]  Date: 2/15/2023   Time: 1:28 PM       Indication:  screen colon cancer      Consent: I have discussed with the patient and/or the patient representative the indication, alternatives, and the possible risks and/or complications of the planned procedure and the anesthesia methods. The patient and/or patient representative appear to understand and agree to proceed. Vital Signs:   Vitals:    02/15/23 1131   BP: 120/76   Pulse: 94   Resp: 14   Temp: 98.3 °F (36.8 °C)   SpO2: 100%       Past Medical History:   has a past medical history of Abnormal Pap smear of cervix, Acne, Attention deficit disorder, GERD (gastroesophageal reflux disease), Gestational diabetes, Hypertension, PID (pelvic inflammatory disease), and Uterine prolapse. Past Surgical History:   has a past surgical history that includes Tonsillectomy (Bilateral, 1992); Liposuction (04/13/2007); pr unlisted procedure abdomen peritoneum & omentum; heent; and Tympanostomy tube placement (2005). Medications:   Scheduled Meds:    flumazenil  0.2 mg IntraVENous Once    benzocaine   Mouth/Throat 4x Daily    EPINEPHrine  1 mg IntraVENous Once    glycopyrrolate  0.1 mg IntraVENous Once     Continuous Infusions:    sodium chloride 100 mL/hr at 02/15/23 1130    fentaNYL      midazolam       PRN Meds: naloxone, simethicone, diphenhydrAMINE  Home Meds:   Prior to Admission medications    Medication Sig Start Date End Date Taking?  Authorizing Provider   amLODIPine (NORVASC) 2.5 MG tablet Take 1 tablet by mouth daily 11/20/17  Yes Historical Provider, MD   hydroCHLOROthiazide (HYDRODIURIL) 25 MG tablet Take 25 mg by mouth daily 11/18/16  Yes Historical Provider, MD   norethindrone (MICRONOR) 0.35 MG tablet Take 0.35 mg by mouth daily 8/18/21  Yes Historical Provider, MD   spironolactone (ALDACTONE) 50 MG tablet Take 25 mg by mouth daily 5/10/19  Yes Historical Provider, MD   tretinoin (RETIN-A) 0.025 % cream Apply 1 application topically as needed 3/31/18  Yes Historical Provider, MD   ibuprofen (ADVIL;MOTRIN) 200 MG tablet Take 400 mg by mouth every 6 hours as needed for Pain   Yes Historical Provider, MD   Calcium Citrate-Vitamin D (CALCIUM + D PO) Take 1 tablet by mouth daily Indications: supplement   Yes Historical Provider, MD   amphetamine-dextroamphetamine (ADDERALL, 10MG,) 10 MG tablet Take 10 mg by mouth daily. Indications: Attention Deficit Disorder   Yes Historical Provider, MD     Coumadin Use Last 7 Days:  no  Antiplatelet drug therapy use last 7 days: no  Other anticoagulant use last 7 days: no  Additional Medication Information:  None      Pre-Sedation Documentation and Exam:   Vital signs have been reviewed (see flow sheet for vitals). I have reviewed the patient's history and review of systems.     Mallampati Airway Assessment:  normal, dentition not prohibitive, normal neck range of motion, Mallampati Class III - (soft palate & base of uvula are visible)    Prior History of Anesthesia Complications:   none    ASA Classification:  Class 2 - A normal healthy patient with mild systemic disease    Sedation/ Anesthesia Plan:   intravenous sedation    Medications Planned:   midazolam (Versed) intravenously and fentanyl intravenously    Patient is an appropriate candidate for plan of sedation: yes    Electronically signed by Ana Mcwilliams MD on 2/15/2023 at 1:28 PM

## 2023-02-15 NOTE — PROCEDURES
[facility]  Colonoscopy Procedure Report  _______________________________________________________  Patient: Singh Lindquist                                        Attending Physician: Renetta Jennings MD    Patient ID: [id number]                                    Referring Physician: Karlie Paris DO    Exam Date: 2/15/2023     Introduction: A  49 y.o. female patient, presents for inpatient Colonoscopy    Indications: Pt of Dr. Dimitri Monique, here for her first screening colonoscopy. Life-Long Constipation struggles,  every 4 to 5 days alternating occasionally with bouts of transient dietary-triggered loose stools, then right back to baseline of constipation, abdominal fullness and cramping, bloating, tenesmus (increasingly worsened recently). Constipation is exacerbated by traveling longer distances. Now she has one bm every one to two days with the help of Linzess 145 mcg every 2 days x 2 years. *Treatments tried and failed:  Increased PO daily water intake, Increased dietary fiber intake, Miralax, Dulcolax, Stool Softeners, Milk of Magnesia, OTC laxatives (Mag Citrate, Senakot, Senna tea), Dulcolax and fleet suppositories and enemas. She has HTN, Had Abdominoplasty. Consent: The benefits, risks, and alternatives to the procedure were discussed and informed consent was obtained from the patient. Preparation: EKG, pulse, pulse oximetry and blood pressure were monitored throughout the procedure. ASA Classification: Class II- . The heart is an S1-S2 and regular heart rate and rhythm. Lungs are clear to auscultation and percussion. Abdomen is soft, nondistended, and nontender. Mental Status: awake, alert, and oriented to person, place, and time    Medications:  Fentanyl 100 mcg IV before procedure. Versed 3 mg IV throughout the procedure. Rectal Exam: Normal Rectal Exam with acceptable anal resting tone, voluntary contraction and relaxation during defecatory effort.  No rectocele perineal descent or anal tenderness. No Blood. Pathology Specimens:  0    Procedure: The colonoscope was passed with great ease through the anus under direct visualization and advanced to the cecum and 5 cm inside the terminal ileum. Retroflexion is made in the ascending colon. The patient required positioning on the back to aid in the passage of the scope. The scope was withdrawn and the mucosa was carefully examined. The quality of the preparation was excellent. The views were excellent. The patient's toleration of the procedure was excellent. The exam was done twice to the cecum. Total time is 12 minutes and withdrawal time is 10 minutes. Findings:    Rectum:   Tiny internal hemorrhoids. Sigmoid:   normal  Descending Colon:   Normal   Transverse Colon:   Normal   Ascending Colon:   Normal  Cecum:   Normal  Terminal Ileum:   Normal.      Unplanned Events: There were no unplanned events. Estimated Blood Loss: None  IMPLANTS: * No implants in log *  Impressions: Tiny internal hemorrhoids. Normal Mucosa. No blood, diverticula, polyps or AVM found. Complications: None; patient tolerated the procedure well. Recommendations:  Discharge home when standard parameters are met. Resume a high fiber diet. Resume own medications. Avoid all NSAID's for  Colonoscopy recommendation in 10 years.   Continue taking the Linzess but can also Take Miralax and/ or Colace 100 mg on regular basis if constipated    Procedure Codes:    COLONOSCOPY [[id number]]    Endoscope Information:  Model Number(s)    [id number]   Assistant: None  Signed By: Yogesh Zambrano MD Date: 2/15/2023

## (undated) DEVICE — ENDO CARRY-ON PROCEDURE KIT INCLUDES ENZYMATIC SPONGE, GAUZE, BIOHAZARD LABEL, TRAY, LUBRICANT, DIRTY SCOPE LABEL, WATER LABEL, TRAY, DRAWSTRING PAD, AND DEFENDO 4-PIECE KIT.: Brand: ENDO CARRY-ON PROCEDURE KIT

## (undated) DEVICE — SYRINGE MED 3ML CLR PLAS STD N CTRL LUERLOCK TIP DISP

## (undated) DEVICE — CATHETER IV 22GA L1IN BLU POLYUR STR HUB RADPQ PROTCT +

## (undated) DEVICE — SPONGE GZ W4XL4IN RAYON POLY 4 PLY NONWOVEN FASTER WICKING

## (undated) DEVICE — KENDALL RADIOLUCENT FOAM MONITORING ELECTRODE RECTANGULAR SHAPE: Brand: KENDALL

## (undated) DEVICE — TOURNIQUET PHLEB W1XL18IN BLU FLAT RL AND BND REUSE FOR IV

## (undated) DEVICE — SYRINGE MED 50ML LUERSLIP TIP

## (undated) DEVICE — Device

## (undated) DEVICE — SET ADMIN 16ML TBNG L100IN 2 Y INJ SITE IV PIGGY BK DISP (ORDER IN MULIPLES OF 48)

## (undated) DEVICE — 4-PORT MANIFOLD: Brand: NEPTUNE 2

## (undated) DEVICE — SINGLE PORT MANIFOLD: Brand: NEPTUNE 2

## (undated) DEVICE — NEEDLE SYR 18GA L1.5IN RED PLAS HUB S STL BLNT FILL W/O